# Patient Record
Sex: MALE | URBAN - METROPOLITAN AREA
[De-identification: names, ages, dates, MRNs, and addresses within clinical notes are randomized per-mention and may not be internally consistent; named-entity substitution may affect disease eponyms.]

---

## 2021-05-25 ENCOUNTER — RECORDS - HEALTHEAST (OUTPATIENT)
Dept: ADMINISTRATIVE | Facility: CLINIC | Age: 31
End: 2021-05-25

## 2025-07-24 ENCOUNTER — OFFICE VISIT (OUTPATIENT)
Dept: URGENT CARE | Facility: URGENT CARE | Age: 35
End: 2025-07-24
Payer: MEDICAID

## 2025-07-24 VITALS
HEIGHT: 74 IN | DIASTOLIC BLOOD PRESSURE: 73 MMHG | OXYGEN SATURATION: 98 % | HEART RATE: 60 BPM | SYSTOLIC BLOOD PRESSURE: 120 MMHG | WEIGHT: 215.2 LBS | RESPIRATION RATE: 18 BRPM | BODY MASS INDEX: 27.62 KG/M2

## 2025-07-24 DIAGNOSIS — L30.9 DERMATITIS: ICD-10-CM

## 2025-07-24 DIAGNOSIS — K64.4 EXTERNAL HEMORRHOIDS: Primary | ICD-10-CM

## 2025-07-24 RX ORDER — HYDROCORTISONE 25 MG/G
CREAM TOPICAL 2 TIMES DAILY PRN
Qty: 60 G | Refills: 0 | Status: SHIPPED | OUTPATIENT
Start: 2025-07-24

## 2025-07-24 RX ORDER — LIDOCAINE 50 MG/G
OINTMENT TOPICAL 2 TIMES DAILY
Qty: 100 G | Refills: 0 | Status: SHIPPED | OUTPATIENT
Start: 2025-07-24

## 2025-07-24 RX ORDER — TRIAMCINOLONE ACETONIDE 1 MG/G
CREAM TOPICAL 2 TIMES DAILY
Qty: 80 G | Refills: 0 | Status: SHIPPED | OUTPATIENT
Start: 2025-07-24

## 2025-07-24 ASSESSMENT — PAIN SCALES - GENERAL: PAINLEVEL_OUTOF10: SEVERE PAIN (7)

## 2025-07-24 NOTE — PROGRESS NOTES
Urgent Care Clinic Visit    Chief Complaint   Patient presents with    Rectal Problem     Hemorrhoids that bleeding, waking up at night with pain, when he sits forward he gets shooting pain. Hard to wipe due to pain. Possible STD. Dry skin on shaft of penis and wrists, neck.                 7/24/2025     5:54 PM   Additional Questions   Roomed by Candy TOWNSEND   Accompanied by self

## 2025-07-24 NOTE — PROGRESS NOTES
External hemorrhoids  - lidocaine (XYLOCAINE) 5 % external ointment; Apply topically 2 times daily.  - hydrocortisone, Perianal, (HYDROCORTISONE) 2.5 % cream; Place rectally 2 times daily as needed for hemorrhoids.  - Adult Colorectal Surgery  Referral; Future  - HIV Antigen Antibody Combo Cascade  - Treponema Abs w Reflex to RPR and Titer  - Neisseria gonorrhoeae PCR  - Chlamydia trachomatis PCR     Hemorrhoids: Care Instructions  Overview     Hemorrhoids are swollen veins that develop in the anal canal. Bleeding during bowel movements, itching, and rectal pain are the most common symptoms. Hemorrhoids can be uncomfortable at times, but rarely are they a serious problem.  Most of the time, you can treat them with simple changes to your diet and bowel habits. These changes include eating more fiber and not straining to pass stools. Most hemorrhoids don't need surgery or other treatment unless they are very large and painful or bleed a lot.  Follow-up care is a key part of your treatment and safety. Be sure to make and go to all appointments, and call your doctor if you are having problems. It's also a good idea to know your test results and keep a list of the medicines you take.  How can you care for yourself at home?  Sit in a few inches of warm water (sitz bath) 3 times a day and after bowel movements. The warm water helps with pain and itching.  Put ice on your anal area several times a day for 10 minutes at a time. Put a thin cloth between the ice and your skin. Follow this by placing a warm, wet towel on the area for another 10 to 20 minutes.  Take pain medicines exactly as directed.  If the doctor gave you a prescription medicine for pain, take it as prescribed.  If you are not taking a prescription pain medicine, ask your doctor if you can take an over-the-counter medicine.  Keep the anal area clean, but be gentle. Use water and a fragrance-free soap, or use baby wipes or medicated pads such as  "Tucks.  Wear cotton underwear and loose clothing to decrease moisture in the anal area.  Eat more fiber. Include foods such as whole-grain breads and cereals, raw vegetables, raw and dried fruits, and beans.  Drink plenty of fluids. If you have kidney, heart, or liver disease and have to limit fluids, talk with your doctor before you increase the amount of fluids you drink.  Use a stool softener that contains bran or psyllium. You can save money by buying bran or psyllium (available in bulk at most health food stores) and sprinkling it on foods or stirring it into fruit juice. Or you can use a product such as Metamucil or Hydrocil.  Practice healthy bowel habits.  Go to the bathroom as soon as you have the urge.  Avoid straining to pass stools. Relax and give yourself time to let things happen naturally.  Do not hold your breath while passing stools.  Do not read while sitting on the toilet. Get off the toilet as soon as you have finished.  Take your medicines exactly as prescribed. Call your doctor if you think you are having a problem with your medicine.  When should you call for help?   Call 911 anytime you think you may need emergency care. For example, call if:    You pass maroon or very bloody stools.   Call your doctor now or seek immediate medical care if:    You have increased pain.     You have increased bleeding.   Watch closely for changes in your health, and be sure to contact your doctor if:    Your symptoms have not improved after 3 or 4 days.   Where can you learn more?  Go to https://www.[employer].net/patiented  Enter F228 in the search box to learn more about \"Hemorrhoids: Care Instructions.\"  Current as of: October 19, 2024  Content Version: 14.5    [id number] [employer].   Care instructions adapted under license by your healthcare professional. If you have questions about a medical condition or this instruction, always ask your healthcare professional. [employer] disclaims " any warranty or liability for your use of this information.          Patient was advised to return to clinic for reevaluation (either UC or PCP) if symptoms do not improve in 7 days. Patient educated on red flag symptoms and asked to go directly to the ED if these symptoms present themselves.       Dermatitis  - triamcinolone (KENALOG) 0.1 % external cream; Apply topically 2 times daily.    To be used on the shaft of the penis 2x/day for the next week. Follow up with PCP if not improve after 1-2 weeks.     Yossi Jewell PA-C  [facility]    Subjective   35 year old who presents to clinic today for the following health issues:    Rectal Problem       HPI     Hemorrhoids  Onset/Duration: Hemorrhoids that are bleeding, waking up at night with pain, when he sits forward he gets shooting pain. Hard to wipe due to pain. 3-4 days ago. Patient has a history of hemorrhoids   Description:   Sulma-anal lump: YES  Pain: YES  Itching: No  Accompanying Signs & Symptoms:  Blood in stool: YES  Changes in stool pattern: No- Patient denies any diarrhea or constipation   History:   Any previous GI studies done: None   Family History of colon cancer: No  Precipitating factors:   None  Alleviating factors:  None  Therapies tried and outcome: Preparation H but this has not been helpful.     Genitourinary - Male  Onset/Duration: Concern for possible STD. Dry skin on shaft of penis and wrists, neck.    Description:   Dysuria (painful urination): No  Hematuria (blood in urine): No  Frequency: No  Waking at night to urinate: No  Hesitancy (delay in urine): No  Retention (unable to empty): No  Decrease in urinary flow: No  Incontinence: No  Accompanying Signs & Symptoms:  Fever: No  Back/Flank pain: No  Urethral discharge: No  Testicle lumps/masses/pain: No  Nausea and/or vomiting: No  Abdominal pain: No  History:   History of frequent UTI s: No  History of kidney stones: No  History of hernias: No  Personal or Family  history of Prostate problems: No  Sexually active: YES  Precipitating or alleviating factors: None  Therapies tried and outcome: none    Review of Systems   Review of Systems   See HPI    Objective        BP: 120/73 Pulse: 60   Resp: 18 SpO2: 98 %       Physical Exam   Physical Exam  Constitutional:       General: He is not in acute distress.     Appearance: Normal appearance. He is normal weight. He is not ill-appearing, toxic-appearing or diaphoretic.   HENT:      Head: Normocephalic and atraumatic.   Cardiovascular:      Rate and Rhythm: Normal rate.      Pulses: Normal pulses.   Pulmonary:      Effort: Pulmonary effort is normal. No respiratory distress.   Genitourinary:     Rectum: Tenderness and external hemorrhoid present. No anal fissure.              Comments: Hemorrhoid in the area shown above. Patient also has some dry toya spots on the penis is the area shown above. Patient's  exam is unremarkable for anything concerning for STIs. Exam is otherwise normal.   Neurological:      General: No focal deficit present.      Mental Status: He is alert and oriented to person, place, and time. Mental status is at baseline.      Gait: Gait normal.   Psychiatric:         Mood and Affect: Mood normal.         Behavior: Behavior normal.         Thought Content: Thought content normal.         Judgment: Judgment normal.          No results found for this or any previous visit (from the past 24 hours).    All labs that were finalized during the visit were reviewed by me personally. Any pending labs will be reviewed by urgent care staff in the following days. Patient will be notified either via [employer] message or phone call of any pertinent abnormal results. Patient was informed that we will not necessarily reach out if pending lab results are normal.

## 2025-07-30 ENCOUNTER — PRE VISIT (OUTPATIENT)
Dept: SURGERY | Facility: CLINIC | Age: 35
End: 2025-07-30
Payer: MEDICAID

## 2025-07-30 NOTE — CONFIDENTIAL NOTE
COLON AND RECTAL SURGERY PRE-VISIT:  Diagnosis, Referred by & from: Hemorrhoids.   Appt date: 8/27/2025 with MARYAN Klein at [facility]     Records Requested       Record  Facility Outcome:   None   07/30/25 at 2:59 PM:  All relevant records are bookmarked under [facility] EMT.  No records to request at this time. PRE-VISIT COMPLETE.    Complete [x]       CARMINA RodriguezT  Colon and Rectal Surgery